# Patient Record
Sex: FEMALE | Race: WHITE | URBAN - METROPOLITAN AREA
[De-identification: names, ages, dates, MRNs, and addresses within clinical notes are randomized per-mention and may not be internally consistent; named-entity substitution may affect disease eponyms.]

---

## 2022-01-20 ENCOUNTER — OFFICE VISIT (OUTPATIENT)
Dept: URBAN - METROPOLITAN AREA CLINIC 46 | Facility: CLINIC | Age: 77
End: 2022-01-20

## 2022-01-20 DIAGNOSIS — H35.3211 EXUDATIVE MACULAR DEGENERATION, WITH ACTIVE CHOROIDAL NEOVASCULARIZATION, RIGHT EYE: Primary | ICD-10-CM

## 2022-01-20 PROCEDURE — 67028 INJECTION EYE DRUG: CPT | Performed by: OPHTHALMOLOGY

## 2022-01-20 PROCEDURE — 92134 CPTRZ OPH DX IMG PST SGM RTA: CPT | Performed by: OPHTHALMOLOGY

## 2022-01-20 PROCEDURE — 99214 OFFICE O/P EST MOD 30 MIN: CPT | Performed by: OPHTHALMOLOGY

## 2022-01-20 RX ORDER — CHOLECALCIFEROL TAB 10 MCG (400 UNIT) 10 MCG
TAB ORAL
Refills: 0 | Status: ACTIVE
Start: 2022-01-20

## 2022-01-20 RX ORDER — CRANBERRY FRUIT EXTRACT 650 MG
CAPSULE ORAL
Refills: 0 | Status: ACTIVE
Start: 2022-01-20

## 2022-01-20 RX ORDER — CARBOXYMETHYLCELLULOSE SODIUM 0.5 %
0.5 % DROPS OPHTHALMIC (EYE)
Refills: 0 | Status: ACTIVE
Start: 2022-01-20

## 2022-01-20 RX ORDER — OMEGA-3 FATTY ACIDS CAP 1000 MG 1000 MG
CAP ORAL
Refills: 0 | Status: ACTIVE
Start: 2022-01-20

## 2022-01-20 ASSESSMENT — INTRAOCULAR PRESSURE
OS: 14
OD: 14

## 2022-01-20 NOTE — IMPRESSION/PLAN
Impression: Exudative macular degeneration, with active choroidal neovascularization, left eye: H35.3350. Plan: The patient has wet ARMD. They are advised to receive an intravitreal injection of anti vascular endothelial growth factor. The risks and benefits of intravitreal injection were explained. The patient understands and wishes to not proceed at this time, will think about it.

## 2022-01-20 NOTE — IMPRESSION/PLAN
Impression: Exudative macular degeneration, with active choroidal neovascularization, right eye: H35.3395. Plan: The patient has wet ARMD. They are advised to receive an intravitreal injection of anti vascular endothelial growth factor. The risks and benefits of intravitreal injection were explained. The patient understands and wishes to proceed with avastin injection.

## 2022-02-08 ENCOUNTER — PROCEDURE (OUTPATIENT)
Dept: URBAN - METROPOLITAN AREA CLINIC 46 | Facility: CLINIC | Age: 77
End: 2022-02-08

## 2022-02-08 DIAGNOSIS — H25.13 AGE-RELATED NUCLEAR CATARACT, BILATERAL: ICD-10-CM

## 2022-02-08 DIAGNOSIS — H35.3221 EXUDATIVE MACULAR DEGENERATION, WITH ACTIVE CHOROIDAL NEOVASCULARIZATION, LEFT EYE: Primary | ICD-10-CM

## 2022-02-08 PROCEDURE — 67028 INJECTION EYE DRUG: CPT | Performed by: OPHTHALMOLOGY

## 2022-02-08 PROCEDURE — 92134 CPTRZ OPH DX IMG PST SGM RTA: CPT | Performed by: OPHTHALMOLOGY

## 2022-02-08 ASSESSMENT — INTRAOCULAR PRESSURE
OS: 14
OD: 14

## 2022-02-08 NOTE — IMPRESSION/PLAN
Impression: Exudative macular degeneration, with active choroidal neovascularization, left eye: H35.8174. Plan: The patient has wet ARMD. They are advised to receive an intravitreal injection of anti vascular endothelial growth factor. The risks and benefits of intravitreal injection were explained. The patient understands and wishes to proceed with avastin injection.

## 2023-01-13 ENCOUNTER — OFFICE VISIT (OUTPATIENT)
Dept: URBAN - METROPOLITAN AREA CLINIC 46 | Facility: CLINIC | Age: 78
End: 2023-01-13

## 2023-01-13 DIAGNOSIS — H35.3221 EXUDATIVE MACULAR DEGENERATION, WITH ACTIVE CHOROIDAL NEOVASCULARIZATION, LEFT EYE: ICD-10-CM

## 2023-01-13 DIAGNOSIS — H35.3211 EXUDATIVE MACULAR DEGENERATION, WITH ACTIVE CHOROIDAL NEOVASCULARIZATION, RIGHT EYE: Primary | ICD-10-CM

## 2023-01-13 DIAGNOSIS — H25.13 AGE-RELATED NUCLEAR CATARACT, BILATERAL: ICD-10-CM

## 2023-01-13 PROCEDURE — 99214 OFFICE O/P EST MOD 30 MIN: CPT | Performed by: OPHTHALMOLOGY

## 2023-01-13 PROCEDURE — 92134 CPTRZ OPH DX IMG PST SGM RTA: CPT | Performed by: OPHTHALMOLOGY

## 2023-01-13 ASSESSMENT — INTRAOCULAR PRESSURE
OD: 14
OS: 14

## 2023-01-13 NOTE — IMPRESSION/PLAN
Impression: Exudative macular degeneration, with active choroidal neovascularization, right eye: H36.0139. Plan: The patient has wet ARMD. They are advised to receive an intravitreal injection of anti vascular endothelial growth factor. The risks and benefits of intravitreal injection were explained. The patient understands and wishes to proceed with avastin injection OD.

## 2023-01-13 NOTE — IMPRESSION/PLAN
Impression: Exudative macular degeneration, with active choroidal neovascularization, left eye: H35.7380. Plan: The patient has wet ARMD. They are advised to receive an intravitreal injection of anti vascular endothelial growth factor. The risks and benefits of intravitreal injection were explained.  The patient understands and wishes to proceed with avastin injection OS

## 2024-01-04 ENCOUNTER — OFFICE VISIT (OUTPATIENT)
Dept: URBAN - METROPOLITAN AREA CLINIC 46 | Facility: CLINIC | Age: 79
End: 2024-01-04

## 2024-01-04 DIAGNOSIS — H35.3232 BILATERAL EXUDATIVE AGE-RELATED MACULAR DEGENERATION W/ INACTIVE CHOROIDAL NEOVASCULARIZATION: Primary | ICD-10-CM

## 2024-01-04 PROCEDURE — 99213 OFFICE O/P EST LOW 20 MIN: CPT | Performed by: SPECIALIST

## 2024-01-04 PROCEDURE — 92134 CPTRZ OPH DX IMG PST SGM RTA: CPT | Performed by: SPECIALIST

## 2024-01-04 ASSESSMENT — INTRAOCULAR PRESSURE
OS: 14
OD: 14

## 2025-01-10 ENCOUNTER — OFFICE VISIT (OUTPATIENT)
Dept: URBAN - METROPOLITAN AREA CLINIC 48 | Facility: CLINIC | Age: 80
End: 2025-01-10

## 2025-01-10 DIAGNOSIS — H35.3231 EXUDATIVE MACULAR DEGENERATION, WITH ACTIVE CHOROIDAL NEOVASCULARIZATION, BILATERAL: Primary | ICD-10-CM

## 2025-01-10 PROCEDURE — 99204 OFFICE O/P NEW MOD 45 MIN: CPT | Performed by: OPHTHALMOLOGY

## 2025-01-10 ASSESSMENT — INTRAOCULAR PRESSURE
OD: 15
OS: 13